# Patient Record
Sex: FEMALE | Race: WHITE
[De-identification: names, ages, dates, MRNs, and addresses within clinical notes are randomized per-mention and may not be internally consistent; named-entity substitution may affect disease eponyms.]

---
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
## 2023-02-01 ENCOUNTER — HOSPITAL ENCOUNTER (OUTPATIENT)
Dept: HOSPITAL 47 - SLEEP | Age: 23
End: 2023-02-01
Payer: COMMERCIAL

## 2023-02-01 DIAGNOSIS — G47.33: Primary | ICD-10-CM

## 2023-02-01 DIAGNOSIS — Z99.89: ICD-10-CM

## 2023-02-01 PROCEDURE — 99211 OFF/OP EST MAY X REQ PHY/QHP: CPT

## 2023-02-01 NOTE — P.SLEEP
History of Present Illness


DATE: 02/01/2023





CONSULTATION/NEW PATIENT EVALUATION





HISTORY OF PRESENT ILLNESS/SLEEP-WAKE EVALUATION: 22-year-old lady had been ev

aluated in the sleep center for significant excessive daytime sleepiness and 

possible obstructive sleep apnea hypopnea syndrome.





SLEEP SCHEDULE: Usually sleep schedule from midnight until noon on weekdays and 

until 2 PM on days off.  Patient does not feel refreshed after sleep for 8 

hours.





FALLING ASLEEP: No problems with falling asleep, no TV in bedroom.





DURING SLEEP: Patient has mild snoring, multiple amount of movements during the 

sleep, witnessed episodes of stop breathing during the sleep, usually no 

awakenings from sleep.  Positive history of hypo-neurological hallucinations, no

sleep paralysis.  Questionable positive episodes of cataplexy as a reaction on 

laugh.





DURING THE DAY/WAKE STATE: In the morning patient wake up tired, has 

difficulties to pay attention, falling asleep during the day, has problems with 

memory, concentration, irritability, depression.  [place] sleepiness scale is 

significantly increased to 15.  Patient takes nap at 5 PM.





PAST MEDICAL HISTORY: Mostly negative.





PAST SURGICAL HISTORY: [place] tooth removed.





MEDICATIONS: Birth control pills.





SOCIAL HISTORY: Negative for smoking, alcohol consumption occasional.





FAMILY HISTORY: Epilepsy, snoring, mental illness.





REVIEW OF SYSTEMS: Sleepiness, significant amount of movements during the sleep.

No fevers. No double vision. No recent chest pain. No shortness of breath. No 

abdominal pain. No bleeding episodes. No blood in urine. No seizure episodes. 





PHYSICAL EXAMINATION: 


GENERAL: A pleasant patient without any distress. 


VITAL SIGNS: /64, HR 85, RR 14, weight 135.4 pounds, height 5 foot 8.5 

inches, body mass index 20.2, temperature 97.3, oxygen saturation at room air 

99%.


HEENT: PERRLA, EOMI. Evaluation of oropharynx showed tongue protrudes midline, 

low position of soft palate Mallampati 3.


NECK: Supple. No JVD. Thyroid is not palpable.  13 inches in circumference.


LUNGS: Clear to percussion and to auscultation. Good air exchange. No wheezing 

or rhonchi. 


HEART: S1, S2 regular. No murmurs, gallops or rubs. 


ABDOMEN: Soft and nontender. Bowel sounds are present. No organomegaly 

appreciated. 


EXTREMITIES: No clubbing or cyanosis. 


CNS: Awake, alert, and oriented x3. Cranial nerves 2 to 7 intact. There is no 

fasciculation or atrophy noted. No focal deficits observed. 





ASSESSMENT:


1.  Snoring, witnessed episodes of stop breathing, low position of soft palate 

Mallampati 4, sleepiness.  Possible obstructive sleep apnea hypopnea syndrome.


2.  Significant excessive daytime sleepiness, [place] Sleepiness Scale increased

to 15.  Patient does not the feel refreshed after 8 hours of sleep, needs at 

least 12 hours.  Positive history of hypo-neurological hallucinations.  

Questionable positive history of cataplexy.  Differential diagnosis include the 

idiopathic hypersomnia and narcolepsy.


3.  Significant amount of movements during the sleep possibly periodic limb 

movements.








PLAN: 


1.   Polysomnography for evaluation of patient's breathing during sleep and to 

check for possible periodic limb movements.  Fallowing multiple sleep latency 

test if sleep study negative for physical abnormalities of sleep. 


2.   CPAP/BiPAP titration if sleep study confirms obstructive sleep apnea-

hypopnea syndrome. 


3.   Preferable position during sleep on the side. 


4.   No driving if patient feels any sleepiness. Patient is aware of civil and 

criminal liability for unsafe driving. 


5.                Sleep hygiene with regular sleep time for at least 7.5-8 

hours.


6.   Watching weight. 





Thank you very much for referring this patient for consultation.





Sincerely,











Andrea Harding MD, PhD, FAASM.


Diplomat of American Board of Sleep Medicine,


Sleep Medicine Board by American Board of [employer] Specialities


American Board of Internal Medicine


Medical Director of [facility]

















Past Medical History


Past Medical History: No Reported History


History of Any Multi-Drug Resistant Organisms: None Reported


Past Surgical History: No Surgical Hx Reported


Past Psychological History: Anxiety, Depression


Past Alcohol Use History: None Reported


Past Drug Use History: None Reported





Medications and Allergies


                                Home Medications











 Medication  Instructions  Recorded  Confirmed  Type


 


nitrofurantoin macrocrystaL 100 mg PO BID #10 cap 01/27/17  Rx





[Macrodantin]    








                                    Allergies











Allergy/AdvReac Type Severity Reaction Status Date / Time


 


No Known Allergies Allergy   Verified 01/27/17 21:19














Sleep Note





- Sleep Note


Sleep Note: 


Temperature: 


Pulse Rate: 


Respiratory Rate: 


Blood Pressure: 


SpO2: 


Height: 


Weight: 


BMI: 


Neck Circumference:

## 2023-04-19 ENCOUNTER — HOSPITAL ENCOUNTER (OUTPATIENT)
Dept: HOSPITAL 47 - SLEEP | Age: 23
End: 2023-04-19
Payer: COMMERCIAL

## 2023-04-19 DIAGNOSIS — G47.9: Primary | ICD-10-CM

## 2023-04-19 PROCEDURE — 99212 OFFICE O/P EST SF 10 MIN: CPT

## 2023-04-19 NOTE — P.PN
Subjective


DATE: 04/19/2023





FOLLOW UP VISIT.





22-year-old lady return to sleep center to discuss results of sleep studies and 

following plan.  I discuss results of sleep studies with patient in details.  

Polysomnogram did not show any abnormalities of respiration.  No periodic limb 

movements.  During multiple sleep latency test on the following day patient fell

asleep on 2 naps out of 5 trials.  Mean sleep latency is in normal range.  

Patient continued need to sleep up to 12 hours per night.  If she sleeps 8 

hours, she feels tiredness and sleepiness during the day.


.[place] sleepiness scale is increased to 18.





MEDICATIONS:1.  Birth control pills


                         


During physical exam:


GENERAL: A pleasant patient without any distress. 


VITAL SIGNS: /70, HR 80, RR 12, weight 135.4, temperature 98.1, oxygen 

saturation at room air 98.


HEENT: PERRLA, EOMI.


NECK: Supple. No JVD. 


LUNGS: Clear to percussion and to auscultation. Good air exchange. No wheezing 

or rhonchi. 


HEART: S1, S2 regular. 


ABDOMEN: Soft and nontender. 


EXTREMITIES: No clubbing or cyanosis. 


CNS: Awake, alert, and oriented x3.  No focal deficit. 





Impressions:


1.  Clinically possibly idiopathic hypersomnia, but multiple sleep latency test 

did not confirmed sleepiness.


2.  Normal respiration during the sleep, no obstructive sleep apnea.


3.  No periodic limb movements.











Plan:


1. Precautions related to driving. No driving if feel any sleepiness.  Patient 

is aware about civil and criminal liability for unsafe driving, promised to 

follow recommendations.


2. Sleep hygiene with regular time in bed for at least 10 hours.


3.  Daytime naps permitted 


4.  Follow up visit in 4-6 months.  We may consider to repeat multiple sleep 

latency test.


   








Thank you very much for allowing me to participate in the management of your 

patient.








Andrea Harding MD, PhD, FAASM.


Diplomat of American Board of Sleep Medicine,


Sleep Medicine Board by American Board of Internal Medicine


Medical Director of [facility] [place]